# Patient Record
Sex: FEMALE | URBAN - METROPOLITAN AREA
[De-identification: names, ages, dates, MRNs, and addresses within clinical notes are randomized per-mention and may not be internally consistent; named-entity substitution may affect disease eponyms.]

---

## 2024-10-14 ENCOUNTER — APPOINTMENT (RX ONLY)
Dept: URBAN - METROPOLITAN AREA CLINIC 151 | Facility: CLINIC | Age: 39
Setting detail: DERMATOLOGY
End: 2024-10-14

## 2024-10-14 DIAGNOSIS — L71.8 OTHER ROSACEA: ICD-10-CM | Status: INADEQUATELY CONTROLLED

## 2024-10-14 DIAGNOSIS — L90.5 SCAR CONDITIONS AND FIBROSIS OF SKIN: ICD-10-CM

## 2024-10-14 PROCEDURE — 99204 OFFICE O/P NEW MOD 45 MIN: CPT

## 2024-10-14 PROCEDURE — ? DIAGNOSIS COMMENT

## 2024-10-14 PROCEDURE — ? VISIT COMPLEXITY

## 2024-10-14 PROCEDURE — ? PRESCRIPTION

## 2024-10-14 PROCEDURE — G2211 COMPLEX E/M VISIT ADD ON: HCPCS

## 2024-10-14 PROCEDURE — ? PRESCRIPTION MEDICATION MANAGEMENT

## 2024-10-14 PROCEDURE — ? COUNSELING

## 2024-10-14 RX ORDER — OXYMETAZOLINE HYDROCHLORIDE 1 G/100G
CREAM TOPICAL
Qty: 30 | Refills: 6 | Status: ERX | COMMUNITY
Start: 2024-10-14

## 2024-10-14 RX ORDER — IVERMECTIN 10 MG/G
CREAM TOPICAL
Qty: 45 | Refills: 6 | Status: ERX | COMMUNITY
Start: 2024-10-14

## 2024-10-14 RX ORDER — DOXYCYCLINE 50 MG/1
TABLET, FILM COATED ORAL
Qty: 30 | Refills: 3 | Status: ERX

## 2024-10-14 RX ADMIN — IVERMECTIN: 10 CREAM TOPICAL at 00:00

## 2024-10-14 RX ADMIN — OXYMETAZOLINE HYDROCHLORIDE: 1 CREAM TOPICAL at 00:00

## 2024-10-14 ASSESSMENT — LOCATION ZONE DERM: LOCATION ZONE: LEG

## 2024-10-14 ASSESSMENT — LOCATION SIMPLE DESCRIPTION DERM: LOCATION SIMPLE: RIGHT PRETIBIAL REGION

## 2024-10-14 ASSESSMENT — LOCATION DETAILED DESCRIPTION DERM: LOCATION DETAILED: RIGHT DISTAL PRETIBIAL REGION

## 2024-10-14 NOTE — HPI: OTHER
Condition:: Rosacea
Please Describe Your Condition:: Started flaring this past summer. Was prescribed doxycycline 50mg by PCP 2 months ago which helped with the visible bumps. Redness is still present. Pt endorses severe anemia and had an ablation before 05/2024. Also began iron infusions. Denies stress.
Condition:: Scar
Please Describe Your Condition:: RT leg after falling. Pt states color is not diminishing.

## 2024-10-14 NOTE — PROCEDURE: DIAGNOSIS COMMENT
Comment: Prescribed Rhofade and ivermectin. Counseled pt on use. Also refilled doxycycline 50mg. Photographed face today. Reassured pt about scar on RT pretibial region and that it can take a while for it to heal.\\nFU 3 months.
Detail Level: Generalized
Render Risk Assessment In Note?: no

## 2024-10-14 NOTE — PROCEDURE: PRESCRIPTION MEDICATION MANAGEMENT
Render In Strict Bullet Format?: No
Initiate Treatment: Rhofade 1 % topical cream in morning \\nSoolantra 1 % topical cream at night
Detail Level: Zone
Continue Regimen: Doxycycline 50mg QD

## 2025-05-12 NOTE — PROCEDURE: COUNSELING
Per Dr. Briceño ok for pt to not have a  for procedure     Christine Rich RN    
Detail Level: Zone
Detail Level: Detailed